# Patient Record
Sex: FEMALE | Employment: OTHER | URBAN - METROPOLITAN AREA
[De-identification: names, ages, dates, MRNs, and addresses within clinical notes are randomized per-mention and may not be internally consistent; named-entity substitution may affect disease eponyms.]

---

## 2021-03-23 ENCOUNTER — NURSING HOME VISIT (OUTPATIENT)
Dept: GERIATRICS | Facility: CLINIC | Age: 86
End: 2021-03-23
Payer: COMMERCIAL

## 2021-03-23 VITALS
WEIGHT: 194.45 LBS | OXYGEN SATURATION: 97 % | BODY MASS INDEX: 30.52 KG/M2 | TEMPERATURE: 99.2 F | HEART RATE: 78 BPM | DIASTOLIC BLOOD PRESSURE: 67 MMHG | SYSTOLIC BLOOD PRESSURE: 116 MMHG | HEIGHT: 67 IN | RESPIRATION RATE: 18 BRPM

## 2021-03-23 DIAGNOSIS — I10 ESSENTIAL HYPERTENSION: ICD-10-CM

## 2021-03-23 DIAGNOSIS — E11.42 DIABETIC POLYNEUROPATHY ASSOCIATED WITH TYPE 2 DIABETES MELLITUS (H): ICD-10-CM

## 2021-03-23 DIAGNOSIS — I82.5Z9 CHRONIC DEEP VEIN THROMBOSIS (DVT) OF DISTAL VEIN OF LOWER EXTREMITY, UNSPECIFIED LATERALITY (H): ICD-10-CM

## 2021-03-23 DIAGNOSIS — Z79.4 TYPE 2 DIABETES MELLITUS WITH HYPERGLYCEMIA, WITH LONG-TERM CURRENT USE OF INSULIN (H): ICD-10-CM

## 2021-03-23 DIAGNOSIS — M84.451S: Primary | ICD-10-CM

## 2021-03-23 DIAGNOSIS — Z79.01 CHRONIC ANTICOAGULATION: ICD-10-CM

## 2021-03-23 DIAGNOSIS — D62 ACUTE BLOOD LOSS ANEMIA: ICD-10-CM

## 2021-03-23 DIAGNOSIS — E11.65 TYPE 2 DIABETES MELLITUS WITH HYPERGLYCEMIA, WITH LONG-TERM CURRENT USE OF INSULIN (H): ICD-10-CM

## 2021-03-23 PROBLEM — R26.81 GAIT INSTABILITY: Status: ACTIVE | Noted: 2019-10-29

## 2021-03-23 PROBLEM — G47.20 SLEEP-WAKE DISORDER: Status: ACTIVE | Noted: 2017-03-23

## 2021-03-23 PROCEDURE — 99309 SBSQ NF CARE MODERATE MDM 30: CPT | Performed by: NURSE PRACTITIONER

## 2021-03-23 RX ORDER — CHLORHEXIDINE GLUCONATE ORAL RINSE 1.2 MG/ML
15 SOLUTION DENTAL 2 TIMES DAILY
COMMUNITY
Start: 2021-01-20

## 2021-03-23 RX ORDER — NITROGLYCERIN 0.4 MG/1
0.4 TABLET SUBLINGUAL EVERY 5 MIN PRN
COMMUNITY

## 2021-03-23 RX ORDER — WARFARIN SODIUM 5 MG/1
TABLET ORAL
Start: 2021-03-23

## 2021-03-23 RX ORDER — AMLODIPINE BESYLATE 5 MG/1
5 TABLET ORAL DAILY
COMMUNITY
Start: 2021-01-21

## 2021-03-23 RX ORDER — INSULIN GLARGINE 100 [IU]/ML
45 INJECTION, SOLUTION SUBCUTANEOUS EVERY MORNING
COMMUNITY
Start: 2021-03-22

## 2021-03-23 RX ORDER — ATORVASTATIN CALCIUM 40 MG/1
40 TABLET, FILM COATED ORAL DAILY
COMMUNITY
Start: 2021-01-13

## 2021-03-23 RX ORDER — CARVEDILOL 25 MG/1
25 TABLET ORAL 2 TIMES DAILY
COMMUNITY
Start: 2021-01-13 | End: 2021-03-23

## 2021-03-23 RX ORDER — GABAPENTIN 100 MG/1
100 CAPSULE ORAL AT BEDTIME
COMMUNITY
Start: 2021-03-02

## 2021-03-23 RX ORDER — OXYCODONE HYDROCHLORIDE 5 MG/1
5-10 TABLET ORAL EVERY 4 HOURS PRN
COMMUNITY
Start: 2021-03-18 | End: 2021-03-29

## 2021-03-23 RX ORDER — CHLORAL HYDRATE 500 MG
2 CAPSULE ORAL DAILY
COMMUNITY

## 2021-03-23 RX ORDER — ALBUTEROL SULFATE 90 UG/1
1 AEROSOL, METERED RESPIRATORY (INHALATION) EVERY 4 HOURS PRN
COMMUNITY
Start: 2020-06-25

## 2021-03-23 RX ORDER — CARVEDILOL 12.5 MG/1
12.5 TABLET ORAL 2 TIMES DAILY
Start: 2021-03-23 | End: 2021-03-30

## 2021-03-23 RX ORDER — LANOLIN ALCOHOL/MO/W.PET/CERES
3 CREAM (GRAM) TOPICAL AT BEDTIME
COMMUNITY
Start: 2021-03-22

## 2021-03-23 RX ORDER — AMOXICILLIN 250 MG
2 CAPSULE ORAL 2 TIMES DAILY PRN
COMMUNITY
Start: 2021-03-20

## 2021-03-23 RX ORDER — POLYETHYLENE GLYCOL 3350 17 G/17G
17 POWDER, FOR SOLUTION ORAL DAILY
COMMUNITY
Start: 2021-03-20

## 2021-03-23 RX ORDER — ACETAMINOPHEN 325 MG/1
650 TABLET ORAL EVERY 4 HOURS PRN
COMMUNITY
Start: 2021-03-18 | End: 2021-03-27 | Stop reason: ALTCHOICE

## 2021-03-23 RX ORDER — PANTOPRAZOLE SODIUM 40 MG/1
1 TABLET, DELAYED RELEASE ORAL DAILY
COMMUNITY
Start: 2020-09-14

## 2021-03-23 ASSESSMENT — MIFFLIN-ST. JEOR: SCORE: 1354.63

## 2021-03-23 NOTE — PROGRESS NOTES
"[place] [facility]  PRIMARY CARE PROVIDER AND CLINIC: Garret Abel MD  Chief Complaint   Patient presents with     [facility] F/U     [place] Medical Record Number:  [id number]  Place of Service where encounter took place:  No question data found.    Nuria Whitley  is a 86 year old  (5/24/1934), admitted to the above facility from  [facility]. Hospital stay 3/15/21 through 3/22/21..  Admitted to this facility for  rehab, medical management and nursing care.    HPI:    HPI information obtained from: facility chart records, facility staff, patient report and Care Everywhere Epic chart review.   Brief Summary of Hospital Course: Patient had seen her PCP for right hip/leg pain, but declined xray. Later that day she was walking up steps with her son and they heard a pop and she was unable to bear weight on the right leg. At the ED she was found to have a pathologic fracture of her right femur with a lytic lesion. She underwent ORIF. Post-op course was complicated by anemia, Hgb 7.1, she received 2 units PRBCs. Lantus insulin decreased from 55 units BID to 45 units BID. She remains on her home regimen of Novolog. She is on chronic warfarin for \"chronic\" DVT, but it is not clear how long she has been on this. During brief attempt at chart review, it has been several years.     Updates on Status Since Skilled nursing Admission:   Patient reports that her hip pain is generally controlled. She does have a difficult time getting comfortable in the wheelchair, prefers to have her leg elevated. She says her appetite is good, bowels and bladder are moving fine. She just arrived yesterday, so is awaiting her PT/OT eval. Only 3 BG readings since admit: 177, 277, 162    CODE STATUS/ADVANCE DIRECTIVES DISCUSSION:   CPR/Full code   Patient's living condition: lives with family, son   ALLERGIES: Hydrocodone-acetaminophen  PAST MEDICAL HISTORY:  has no past medical history on file.  PAST " SURGICAL HISTORY:   has no past surgical history on file.  FAMILY HISTORY: family history is not on file.  SOCIAL HISTORY:       Post Discharge Medication Reconciliation Status: discharge medications reconciled, continue medications without change    Current Outpatient Medications   Medication Sig Dispense Refill     acetaminophen (TYLENOL) 325 MG tablet Take 650 mg by mouth every 4 hours as needed       albuterol (PROAIR HFA/PROVENTIL HFA/VENTOLIN HFA) 108 (90 Base) MCG/ACT inhaler Inhale 1 puff into the lungs every 4 hours as needed       amLODIPine (NORVASC) 5 MG tablet Take 5 mg by mouth daily       atorvastatin (LIPITOR) 40 MG tablet Take 40 mg by mouth daily       calcium carbonate-vitamin D (OS-KALYAN) 600-400 MG-UNIT chewable tablet Take 1 chew tab by mouth daily       carvedilol (COREG) 12.5 MG tablet Take 1 tablet (12.5 mg) by mouth 2 times daily       chlorhexidine (PERIDEX) 0.12 % solution Swish and spit 15 mLs in mouth 2 times daily       fish oil-omega-3 fatty acids 1000 MG capsule Take 2 g by mouth daily       gabapentin (NEURONTIN) 100 MG capsule Take 100 mg by mouth At Bedtime       insulin aspart (NOVOLOG PEN) 100 UNIT/ML pen Inject Subcutaneous 3 times daily (with meals) If Blood Sugar is 120 to 149, give 2 Units.  If Blood Sugar is 150 to 199, give 3 Units.  If Blood Sugar is 200 to 249, give 6 Units.  If Blood Sugar is 250 to 299, give 9 Units.  If Blood Sugar is 300 to 349, give 12 Units.  If Blood Sugar is greater than 349, give 15 Units.  If Blood Sugar is greater than 349, call MD.  Special Instructions: Recheck POCT glucose in 2 hours and call MD if greater than 350 mg/dl    ALSO   Per Sliding Scale;  If Blood Sugar is 150 to 199, give 2 Units.  If Blood Sugar is 200 to 249, give 3 Units.  If Blood Sugar is 250 to 299, give 5 Units.  If Blood Sugar is 300 to 349, give 6 Units.  If Blood Sugar is greater than 349, give 8 Units.  At Bedtime       insulin aspart (NOVOLOG VIAL) 100 UNITS/ML vial  "Inject 15-17 Units Subcutaneous 2 times daily Give 15 units before lunch and give 17 units before supper daily       insulin glargine (LANTUS VIAL) 100 UNIT/ML vial Inject 45 Units Subcutaneous 2 times daily       melatonin 3 MG tablet Take 3 mg by mouth At Bedtime       nitroGLYcerin (NITROSTAT) 0.4 MG sublingual tablet Place 0.4 mg under the tongue every 5 minutes as needed for chest pain For chest pain place 1 tablet under the tongue every 5 minutes for 3 doses. If symptoms persist 5 minutes after 1st dose call 911.       oxyCODONE (ROXICODONE) 5 MG tablet Take 5-10 mg by mouth every 4 hours as needed Instructions: 5 mg for pain 5-7/10, 10 mg for pain 8-10/10       pantoprazole (PROTONIX) 40 MG EC tablet Take 1 tablet by mouth daily       polyethylene glycol (MIRALAX) 17 GM/Dose powder Take 17 g by mouth daily       senna-docusate (SENOKOT-S/PERICOLACE) 8.6-50 MG tablet Take 2 tablets by mouth 2 times daily as needed       warfarin ANTICOAGULANT (COUMADIN) 5 MG tablet Take 5mg on Sun,Mon,Wed,Fri and take 7.5mg on Tues,Thurs,Sat         ROS:  10 point ROS of systems including Constitutional, Eyes, Respiratory, Cardiovascular, Gastroenterology, Genitourinary, Integumentary, Musculoskeletal, Psychiatric were all negative except for pertinent positives noted in my HPI.    Vitals:  /67   Pulse 78   Temp 99.2  F (37.3  C)   Resp 18   Ht 1.702 m (5' 7\")   Wt 88.2 kg (194 lb 7.1 oz)   SpO2 97%   BMI 30.45 kg/m    Exam:  GENERAL APPEARANCE:  Alert, in no distress, oriented  ENT:  Mouth and posterior oropharynx normal, moist mucous membranes, normal hearing acuity  EYES:  EOM, conjunctivae, lids, pupils and irises normal  RESP:  respiratory effort and palpation of chest normal, lungs clear to auscultation , no respiratory distress  CV:  Palpation and auscultation of heart done , regular rate and rhythm, no murmur, rub, or gallop, no edema  ABDOMEN:  normal bowel sounds, soft, nontender, no hepatosplenomegaly " or other masses  SKIN:  wound healing well, no signs of infection right hip  PSYCH:  oriented X 3, affect and mood normal    Lab/Diagnostic data:  Recent labs in [facility] reviewed by me today.    INR 1.8 today    ASSESSMENT/PLAN:  (M84.451S) Pathological fracture of right femur, unspecified pathological cause, sequela  (primary encounter diagnosis)  Comment: s/p ORIF. Pain controlled. No s/sx DVT. No s/sx poor wound healing. Goal is to discharge home when PT/OT goals met.  Plan: PT/OT eval and treat, discharge planning per their recommendation. Continue warfarin, monitor s/sx DVT.  Continue oxycodone prn for pain, monitor pain severity. Monitor incision. F/u ortho as scheduled. F/u PCP regarding biopsy results, likely an isolated lesion.    (D62) Acute blood loss anemia  Comment: Due to surgical loss. No s/sx bleeding or anemia today  Plan: Hgb prn    (I10) Essential hypertension  Comment: Little data to assess, BP controlled today. To avoid risk of hypotension, falls, dizziness and tissue hypoperfusion, recommend  BP goal is < 150/90mmHg.  Plan:  Monitor BP. Adjust medication as clinically indicated.    (Z79.01) Chronic anticoagulation  (I82.5Z9) Chronic deep vein thrombosis (DVT) of distal vein of lower extremity, unspecified laterality (H)  Comment: INR is almost therapeutic. Will give dose that appears to be her usual home dose per chart review  Plan: warfarin 5mg Sun,M,W,F and 7.5mg AOD    (E11.65,  Z79.4) Type 2 diabetes mellitus with hyperglycemia, with long-term current use of insulin (H)  Comment: Too little data available to recommend any changes today  Plan: BGM QID. Adjust medication as clinically indicated.    (E11.42) Diabetic polyneuropathy associated with type 2 diabetes mellitus (H)  Comment: Pain controlled  Plan: Continue current POC with no changes at this time and adjustments as needed.      Electronically signed by:  BRENDA Vogel [facility]  Phone:  [phone]

## 2021-03-23 NOTE — LETTER
"    3/23/2021        RE: Nuria Whitley  [address]  [facility] [address]        [place] [facility]  PRIMARY CARE PROVIDER AND CLINIC: Garret Abel MD  Chief Complaint   Patient presents with     [facility] F/U     [place] Medical Record Number:  [id number]  Place of Service where encounter took place:  No question data found.    Nuria Whitley  is a 86 year old  (5/24/1934), admitted to the above facility from  [facility]. Hospital stay 3/15/21 through 3/22/21..  Admitted to this facility for  rehab, medical management and nursing care.    HPI:    HPI information obtained from: facility chart records, facility staff, patient report and Care Everywhere Epic chart review.   Brief Summary of Hospital Course: Patient had seen her PCP for right hip/leg pain, but declined xray. Later that day she was walking up steps with her son and they heard a pop and she was unable to bear weight on the right leg. At the ED she was found to have a pathologic fracture of her right femur with a lytic lesion. She underwent ORIF. Post-op course was complicated by anemia, Hgb 7.1, she received 2 units PRBCs. Lantus insulin decreased from 55 units BID to 45 units BID. She remains on her home regimen of Novolog. She is on chronic warfarin for \"chronic\" DVT, but it is not clear how long she has been on this. During brief attempt at chart review, it has been several years.     Updates on Status Since Skilled nursing Admission:   Patient reports that her hip pain is generally controlled. She does have a difficult time getting comfortable in the wheelchair, prefers to have her leg elevated. She says her appetite is good, bowels and bladder are moving fine. She just arrived yesterday, so is awaiting her PT/OT eval. Only 3 BG readings since admit: 177, 277, 162    CODE STATUS/ADVANCE DIRECTIVES DISCUSSION:   CPR/Full code   Patient's living condition: lives with family, son   ALLERGIES: " Hydrocodone-acetaminophen  PAST MEDICAL HISTORY:  has no past medical history on file.  PAST SURGICAL HISTORY:   has no past surgical history on file.  FAMILY HISTORY: family history is not on file.  SOCIAL HISTORY:       Post Discharge Medication Reconciliation Status: discharge medications reconciled, continue medications without change    Current Outpatient Medications   Medication Sig Dispense Refill     acetaminophen (TYLENOL) 325 MG tablet Take 650 mg by mouth every 4 hours as needed       albuterol (PROAIR HFA/PROVENTIL HFA/VENTOLIN HFA) 108 (90 Base) MCG/ACT inhaler Inhale 1 puff into the lungs every 4 hours as needed       amLODIPine (NORVASC) 5 MG tablet Take 5 mg by mouth daily       atorvastatin (LIPITOR) 40 MG tablet Take 40 mg by mouth daily       calcium carbonate-vitamin D (OS-KALYAN) 600-400 MG-UNIT chewable tablet Take 1 chew tab by mouth daily       carvedilol (COREG) 12.5 MG tablet Take 1 tablet (12.5 mg) by mouth 2 times daily       chlorhexidine (PERIDEX) 0.12 % solution Swish and spit 15 mLs in mouth 2 times daily       fish oil-omega-3 fatty acids 1000 MG capsule Take 2 g by mouth daily       gabapentin (NEURONTIN) 100 MG capsule Take 100 mg by mouth At Bedtime       insulin aspart (NOVOLOG PEN) 100 UNIT/ML pen Inject Subcutaneous 3 times daily (with meals) If Blood Sugar is 120 to 149, give 2 Units.  If Blood Sugar is 150 to 199, give 3 Units.  If Blood Sugar is 200 to 249, give 6 Units.  If Blood Sugar is 250 to 299, give 9 Units.  If Blood Sugar is 300 to 349, give 12 Units.  If Blood Sugar is greater than 349, give 15 Units.  If Blood Sugar is greater than 349, call MD.  Special Instructions: Recheck POCT glucose in 2 hours and call MD if greater than 350 mg/dl    ALSO   Per Sliding Scale;  If Blood Sugar is 150 to 199, give 2 Units.  If Blood Sugar is 200 to 249, give 3 Units.  If Blood Sugar is 250 to 299, give 5 Units.  If Blood Sugar is 300 to 349, give 6 Units.  If Blood Sugar is  "greater than 349, give 8 Units.  At Bedtime       insulin aspart (NOVOLOG VIAL) 100 UNITS/ML vial Inject 15-17 Units Subcutaneous 2 times daily Give 15 units before lunch and give 17 units before supper daily       insulin glargine (LANTUS VIAL) 100 UNIT/ML vial Inject 45 Units Subcutaneous 2 times daily       melatonin 3 MG tablet Take 3 mg by mouth At Bedtime       nitroGLYcerin (NITROSTAT) 0.4 MG sublingual tablet Place 0.4 mg under the tongue every 5 minutes as needed for chest pain For chest pain place 1 tablet under the tongue every 5 minutes for 3 doses. If symptoms persist 5 minutes after 1st dose call 911.       oxyCODONE (ROXICODONE) 5 MG tablet Take 5-10 mg by mouth every 4 hours as needed Instructions: 5 mg for pain 5-7/10, 10 mg for pain 8-10/10       pantoprazole (PROTONIX) 40 MG EC tablet Take 1 tablet by mouth daily       polyethylene glycol (MIRALAX) 17 GM/Dose powder Take 17 g by mouth daily       senna-docusate (SENOKOT-S/PERICOLACE) 8.6-50 MG tablet Take 2 tablets by mouth 2 times daily as needed       warfarin ANTICOAGULANT (COUMADIN) 5 MG tablet Take 5mg on Sun,Mon,Wed,Fri and take 7.5mg on Tues,Thurs,Sat         ROS:  10 point ROS of systems including Constitutional, Eyes, Respiratory, Cardiovascular, Gastroenterology, Genitourinary, Integumentary, Musculoskeletal, Psychiatric were all negative except for pertinent positives noted in my HPI.    Vitals:  /67   Pulse 78   Temp 99.2  F (37.3  C)   Resp 18   Ht 1.702 m (5' 7\")   Wt 88.2 kg (194 lb 7.1 oz)   SpO2 97%   BMI 30.45 kg/m    Exam:  GENERAL APPEARANCE:  Alert, in no distress, oriented  ENT:  Mouth and posterior oropharynx normal, moist mucous membranes, normal hearing acuity  EYES:  EOM, conjunctivae, lids, pupils and irises normal  RESP:  respiratory effort and palpation of chest normal, lungs clear to auscultation , no respiratory distress  CV:  Palpation and auscultation of heart done , regular rate and rhythm, no murmur, " rub, or gallop, no edema  ABDOMEN:  normal bowel sounds, soft, nontender, no hepatosplenomegaly or other masses  SKIN:  wound healing well, no signs of infection right hip  PSYCH:  oriented X 3, affect and mood normal    Lab/Diagnostic data:  Recent labs in [facility] reviewed by me today.    INR 1.8 today    ASSESSMENT/PLAN:  (M84.451S) Pathological fracture of right femur, unspecified pathological cause, sequela  (primary encounter diagnosis)  Comment: s/p ORIF. Pain controlled. No s/sx DVT. No s/sx poor wound healing. Goal is to discharge home when PT/OT goals met.  Plan: PT/OT eval and treat, discharge planning per their recommendation. Continue warfarin, monitor s/sx DVT.  Continue oxycodone prn for pain, monitor pain severity. Monitor incision. F/u ortho as scheduled. F/u PCP regarding biopsy results, likely an isolated lesion.    (D62) Acute blood loss anemia  Comment: Due to surgical loss. No s/sx bleeding or anemia today  Plan: Hgb prn    (I10) Essential hypertension  Comment: Little data to assess, BP controlled today. To avoid risk of hypotension, falls, dizziness and tissue hypoperfusion, recommend  BP goal is < 150/90mmHg.  Plan:  Monitor BP. Adjust medication as clinically indicated.    (Z79.01) Chronic anticoagulation  (I82.5Z9) Chronic deep vein thrombosis (DVT) of distal vein of lower extremity, unspecified laterality (H)  Comment: INR is almost therapeutic. Will give dose that appears to be her usual home dose per chart review  Plan: warfarin 5mg Sun,M,W,F and 7.5mg AOD    (E11.65,  Z79.4) Type 2 diabetes mellitus with hyperglycemia, with long-term current use of insulin (H)  Comment: Too little data available to recommend any changes today  Plan: BGM QID. Adjust medication as clinically indicated.    (E11.42) Diabetic polyneuropathy associated with type 2 diabetes mellitus (H)  Comment: Pain controlled  Plan: Continue current POC with no changes at this time and adjustments as  needed.      Electronically signed by:  BRENDA Vogel [facility]  Phone: [phone]

## 2021-03-25 ENCOUNTER — NURSING HOME VISIT (OUTPATIENT)
Dept: GERIATRICS | Facility: CLINIC | Age: 86
End: 2021-03-25
Payer: COMMERCIAL

## 2021-03-25 VITALS
DIASTOLIC BLOOD PRESSURE: 58 MMHG | HEIGHT: 67 IN | RESPIRATION RATE: 18 BRPM | WEIGHT: 194.45 LBS | OXYGEN SATURATION: 95 % | HEART RATE: 68 BPM | TEMPERATURE: 98.5 F | BODY MASS INDEX: 30.52 KG/M2 | SYSTOLIC BLOOD PRESSURE: 94 MMHG

## 2021-03-25 DIAGNOSIS — M84.451S: Primary | ICD-10-CM

## 2021-03-25 DIAGNOSIS — Z79.01 CHRONIC ANTICOAGULATION: ICD-10-CM

## 2021-03-25 DIAGNOSIS — E11.65 TYPE 2 DIABETES MELLITUS WITH HYPERGLYCEMIA, WITH LONG-TERM CURRENT USE OF INSULIN (H): ICD-10-CM

## 2021-03-25 DIAGNOSIS — I25.118 ATHEROSCLEROSIS OF CORONARY ARTERY OF NATIVE HEART WITH STABLE ANGINA PECTORIS, UNSPECIFIED VESSEL OR LESION TYPE (H): ICD-10-CM

## 2021-03-25 DIAGNOSIS — I82.5Z9 CHRONIC DEEP VEIN THROMBOSIS (DVT) OF DISTAL VEIN OF LOWER EXTREMITY, UNSPECIFIED LATERALITY (H): ICD-10-CM

## 2021-03-25 DIAGNOSIS — I10 ESSENTIAL HYPERTENSION: ICD-10-CM

## 2021-03-25 DIAGNOSIS — Z79.4 TYPE 2 DIABETES MELLITUS WITH HYPERGLYCEMIA, WITH LONG-TERM CURRENT USE OF INSULIN (H): ICD-10-CM

## 2021-03-25 PROCEDURE — 99309 SBSQ NF CARE MODERATE MDM 30: CPT | Performed by: NURSE PRACTITIONER

## 2021-03-25 ASSESSMENT — MIFFLIN-ST. JEOR: SCORE: 1354.65

## 2021-03-25 NOTE — PROGRESS NOTES
"[place] [facility]  [place] Medical Record Number:  [id number]  Place of Service where encounter took place:  [facility] (U) [[id number]]  Chief Complaint   Patient presents with     RECHECK       HPI:    Nuria Whitley  is a 86 year old (5/24/1934), who is being seen today for an episodic care visit. [facility] stay 3/15/21 through 3/22/21. Admitted to this facility for  rehab, medical management and nursing care. Patient had seen her PCP for right hip/leg pain, but declined xray. Later that day she was walking up steps with her son and they heard a pop and she was unable to bear weight on the right leg. At the ED she was found to have a pathologic fracture of her right femur with a lytic lesion. She underwent ORIF. Post-op course was complicated by anemia, Hgb 7.1, she received 2 units PRBCs. Lantus insulin decreased from 55 units BID to 45 units BID. She remains on her home regimen of Novolog. She is on chronic warfarin for \"chronic\" DVT, but it is not clear how long she has been on this. During brief attempt at chart review, it has been several years.     HPI information obtained from: facility chart records, facility staff and patient report.     Today's concern is:  Pathological fracture of right femur, unspecified pathological cause, sequela  Patient says that her pain is controlled and therapy is going ok. No swelling, no calf pain    Essential hypertension  BP 90-100s/50s. 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 LAD: Occluded mid vessel. LCx: 50-75% stenosis mid vessel. RCA: " "50-75% stenosis mid vessel. Successful PTCA/stenting of occluded LAD with 3.0 x 18 mm and 3.5 x 15 mm\"        Past Medical and Surgical History reviewed in Epic today.    MEDICATIONS:    Current Outpatient Medications   Medication Sig Dispense Refill     acetaminophen (TYLENOL) 500 MG tablet Take 2 tablets (1,000 mg) by mouth 3 times daily       albuterol (PROAIR HFA/PROVENTIL HFA/VENTOLIN HFA) 108 (90 Base) MCG/ACT inhaler Inhale 1 puff into the lungs every 4 hours as needed       amLODIPine (NORVASC) 5 MG tablet Take 5 mg by mouth daily       atorvastatin (LIPITOR) 40 MG tablet Take 40 mg by mouth daily       calcium carbonate-vitamin D (OS-KALYAN) 600-400 MG-UNIT chewable tablet Take 1 chew tab by mouth daily       carvedilol (COREG) 12.5 MG tablet Take 1 tablet (12.5 mg) by mouth 2 times daily       chlorhexidine (PERIDEX) 0.12 % solution Swish and spit 15 mLs in mouth 2 times daily       fish oil-omega-3 fatty acids 1000 MG capsule Take 2 g by mouth daily       gabapentin (NEURONTIN) 100 MG capsule Take 100 mg by mouth At Bedtime       insulin aspart (NOVOLOG PEN) 100 UNIT/ML pen Inject Subcutaneous 3 times daily (with meals) If Blood Sugar is 120 to 149, give 2 Units.  If Blood Sugar is 150 to 199, give 3 Units.  If Blood Sugar is 200 to 249, give 6 Units.  If Blood Sugar is 250 to 299, give 9 Units.  If Blood Sugar is 300 to 349, give 12 Units.  If Blood Sugar is greater than 349, give 15 Units.  If Blood Sugar is greater than 349, call MD.  Special Instructions: Recheck POCT glucose in 2 hours and call MD if greater than 350 mg/dl    ALSO   Per Sliding Scale;  If Blood Sugar is 150 to 199, give 2 Units.  If Blood Sugar is 200 to 249, give 3 Units.  If Blood Sugar is 250 to 299, give 5 Units.  If Blood Sugar is 300 to 349, give 6 Units.  If Blood Sugar is greater than 349, give 8 Units.  At Bedtime       insulin aspart (NOVOLOG VIAL) 100 UNITS/ML vial Inject 15-17 Units Subcutaneous 2 times daily Give 15 units " "before lunch and give 17 units before supper daily       insulin glargine (LANTUS VIAL) 100 UNIT/ML vial Inject 45 Units Subcutaneous 2 times daily       melatonin 3 MG tablet Take 3 mg by mouth At Bedtime       nitroGLYcerin (NITROSTAT) 0.4 MG sublingual tablet Place 0.4 mg under the tongue every 5 minutes as needed for chest pain For chest pain place 1 tablet under the tongue every 5 minutes for 3 doses. If symptoms persist 5 minutes after 1st dose call 911.       oxyCODONE (ROXICODONE) 5 MG tablet Take 5-10 mg by mouth every 4 hours as needed Instructions: 5 mg for pain 5-7/10, 10 mg for pain 8-10/10       pantoprazole (PROTONIX) 40 MG EC tablet Take 1 tablet by mouth daily       polyethylene glycol (MIRALAX) 17 GM/Dose powder Take 17 g by mouth daily       senna-docusate (SENOKOT-S/PERICOLACE) 8.6-50 MG tablet Take 2 tablets by mouth 2 times daily as needed       warfarin ANTICOAGULANT (COUMADIN) 5 MG tablet Take 5mg on Sun,Mon,Wed,Fri and take 7.5mg on Tues,Thurs,Sat           REVIEW OF SYSTEMS:  10 point ROS of systems including Constitutional, Eyes, Respiratory, Cardiovascular, Gastroenterology, Genitourinary, Integumentary, Musculoskeletal, Psychiatric were all negative except for pertinent positives noted in my HPI.    Objective:  BP 94/58   Pulse 68   Temp 98.5  F (36.9  C)   Resp 18   Ht 1.702 m (5' 7\")   Wt 88.2 kg (194 lb 7.2 oz)   SpO2 95%   BMI 30.46 kg/m    Exam:  GENERAL APPEARANCE:  Alert, in no distress, oriented  ENT:  Mouth and posterior oropharynx normal, moist mucous membranes, normal hearing acuity  EYES:  EOM, conjunctivae, lids, pupils and irises normal  RESP:  respiratory effort and palpation of chest normal, lungs clear to auscultation , no respiratory distress  CV:  Palpation and auscultation of heart done , regular rate and rhythm, no murmur, rub, or gallop, no edema  ABDOMEN:  normal bowel sounds, soft, nontender, no hepatosplenomegaly or other masses  SKIN:  wound healing well, " no signs of infection right hip  PSYCH:  oriented X 3, affect and mood normal      Labs:   Recent labs in [facility] reviewed by me today.       ASSESSMENT/PLAN:  (M84.451S) Pathological fracture of right femur, unspecified pathological cause, sequela  (primary encounter diagnosis)  Comment: s/p ORIF. Pain controlled. No s/sx DVT. No s/sx poor wound healing. Goal is to discharge home when PT/OT goals met.  Plan: PT/OT eval and treat, discharge planning per their recommendation. Continue warfarin, monitor s/sx DVT. Continue oxycodone prn for pain, monitor pain severity. Monitor incision. F/u ortho as scheduled.    (I10) Essential hypertension  Comment: BP too tightly controlled, which increases her fall risk. Will decrease her BB  Plan: Decrease carvedilol to 6.25mg BID. Monitor BP. Adjust medication as clinically indicated.    (Z79.01) Chronic anticoagulation  (I82.5Z9) Chronic deep vein thrombosis (DVT) of distal vein of lower extremity, unspecified laterality (H)  Comment: Chronic warfarin, INR goal 2-3  Plan: Continue current POC with no changes at this time and adjustments as needed.    (E11.65,  Z79.4) Type 2 diabetes mellitus with hyperglycemia, with long-term current use of insulin (H)  Comment: BG are too variable to adjust insulin, need to continue with sliding scale insulin.   Plan: Continue current POC with no changes at this time and adjustments as needed.    (I25.118) Atherosclerosis of coronary artery of native heart with stable angina pectoris, unspecified vessel or lesion type (H)  Comment: Asymptomatic  Plan: Continue current POC with no changes at this time and adjustments as needed.      Electronically signed by:  BRENDA Vogel [facility]  Phone: [phone]

## 2021-03-25 NOTE — LETTER
"    3/25/2021        RE: Nuria Whitley  [address]  [facility] [address]        [place] [facility]  [place] Medical Record Number:  [id number]  Place of Service where encounter took place:  [facility] ([facility]) [[id number]]  Chief Complaint   Patient presents with     RECHECK       HPI:    Nuria Whitley  is a 86 year old (5/24/1934), who is being seen today for an episodic care visit. [facility] stay 3/15/21 through 3/22/21. Admitted to this facility for  rehab, medical management and nursing care. Patient had seen her PCP for right hip/leg pain, but declined xray. Later that day she was walking up steps with her son and they heard a pop and she was unable to bear weight on the right leg. At the ED she was found to have a pathologic fracture of her right femur with a lytic lesion. She underwent ORIF. Post-op course was complicated by anemia, Hgb 7.1, she received 2 units PRBCs. Lantus insulin decreased from 55 units BID to 45 units BID. She remains on her home regimen of Novolog. She is on chronic warfarin for \"chronic\" DVT, but it is not clear how long she has been on this. During brief attempt at chart review, it has been several years.     HPI information obtained from: facility chart records, facility staff and patient report.     Today's concern is:  Pathological fracture of right femur, unspecified pathological cause, sequela  Patient says that her pain is controlled and therapy is going ok. No swelling, no calf pain    Essential hypertension  BP 90-100s/50s. HR 70s    Chronic anticoagulation  Chronic deep vein thrombosis (DVT) of distal vein of lower extremity, unspecified laterality (H)  On warfarin    Type 2 diabetes mellitus with hyperglycemia, with long-term current use of insulin (H)  BG     Atherosclerosis of coronary artery of native heart with stable angina pectoris, unspecified vessel or lesion type (H)  Per cardiology note from a year ago " "\"Angio-05/18/09: LM: 25% distal stenosis. LAD: Occluded mid vessel. LCx: 50-75% stenosis mid vessel. RCA: 50-75% stenosis mid vessel. Successful PTCA/stenting of occluded LAD with 3.0 x 18 mm and 3.5 x 15 mm\"        Past Medical and Surgical History reviewed in Epic today.    MEDICATIONS:    Current Outpatient Medications   Medication Sig Dispense Refill     acetaminophen (TYLENOL) 500 MG tablet Take 2 tablets (1,000 mg) by mouth 3 times daily       albuterol (PROAIR HFA/PROVENTIL HFA/VENTOLIN HFA) 108 (90 Base) MCG/ACT inhaler Inhale 1 puff into the lungs every 4 hours as needed       amLODIPine (NORVASC) 5 MG tablet Take 5 mg by mouth daily       atorvastatin (LIPITOR) 40 MG tablet Take 40 mg by mouth daily       calcium carbonate-vitamin D (OS-KALYAN) 600-400 MG-UNIT chewable tablet Take 1 chew tab by mouth daily       carvedilol (COREG) 12.5 MG tablet Take 1 tablet (12.5 mg) by mouth 2 times daily       chlorhexidine (PERIDEX) 0.12 % solution Swish and spit 15 mLs in mouth 2 times daily       fish oil-omega-3 fatty acids 1000 MG capsule Take 2 g by mouth daily       gabapentin (NEURONTIN) 100 MG capsule Take 100 mg by mouth At Bedtime       insulin aspart (NOVOLOG PEN) 100 UNIT/ML pen Inject Subcutaneous 3 times daily (with meals) If Blood Sugar is 120 to 149, give 2 Units.  If Blood Sugar is 150 to 199, give 3 Units.  If Blood Sugar is 200 to 249, give 6 Units.  If Blood Sugar is 250 to 299, give 9 Units.  If Blood Sugar is 300 to 349, give 12 Units.  If Blood Sugar is greater than 349, give 15 Units.  If Blood Sugar is greater than 349, call MD.  Special Instructions: Recheck POCT glucose in 2 hours and call MD if greater than 350 mg/dl    ALSO   Per Sliding Scale;  If Blood Sugar is 150 to 199, give 2 Units.  If Blood Sugar is 200 to 249, give 3 Units.  If Blood Sugar is 250 to 299, give 5 Units.  If Blood Sugar is 300 to 349, give 6 Units.  If Blood Sugar is greater than 349, give 8 Units.  At Bedtime       " "insulin aspart (NOVOLOG VIAL) 100 UNITS/ML vial Inject 15-17 Units Subcutaneous 2 times daily Give 15 units before lunch and give 17 units before supper daily       insulin glargine (LANTUS VIAL) 100 UNIT/ML vial Inject 45 Units Subcutaneous 2 times daily       melatonin 3 MG tablet Take 3 mg by mouth At Bedtime       nitroGLYcerin (NITROSTAT) 0.4 MG sublingual tablet Place 0.4 mg under the tongue every 5 minutes as needed for chest pain For chest pain place 1 tablet under the tongue every 5 minutes for 3 doses. If symptoms persist 5 minutes after 1st dose call 911.       oxyCODONE (ROXICODONE) 5 MG tablet Take 5-10 mg by mouth every 4 hours as needed Instructions: 5 mg for pain 5-7/10, 10 mg for pain 8-10/10       pantoprazole (PROTONIX) 40 MG EC tablet Take 1 tablet by mouth daily       polyethylene glycol (MIRALAX) 17 GM/Dose powder Take 17 g by mouth daily       senna-docusate (SENOKOT-S/PERICOLACE) 8.6-50 MG tablet Take 2 tablets by mouth 2 times daily as needed       warfarin ANTICOAGULANT (COUMADIN) 5 MG tablet Take 5mg on Sun,Mon,Wed,Fri and take 7.5mg on Tues,Thurs,Sat           REVIEW OF SYSTEMS:  10 point ROS of systems including Constitutional, Eyes, Respiratory, Cardiovascular, Gastroenterology, Genitourinary, Integumentary, Musculoskeletal, Psychiatric were all negative except for pertinent positives noted in my HPI.    Objective:  BP 94/58   Pulse 68   Temp 98.5  F (36.9  C)   Resp 18   Ht 1.702 m (5' 7\")   Wt 88.2 kg (194 lb 7.2 oz)   SpO2 95%   BMI 30.46 kg/m    Exam:  GENERAL APPEARANCE:  Alert, in no distress, oriented  ENT:  Mouth and posterior oropharynx normal, moist mucous membranes, normal hearing acuity  EYES:  EOM, conjunctivae, lids, pupils and irises normal  RESP:  respiratory effort and palpation of chest normal, lungs clear to auscultation , no respiratory distress  CV:  Palpation and auscultation of heart done , regular rate and rhythm, no murmur, rub, or gallop, no " edema  ABDOMEN:  normal bowel sounds, soft, nontender, no hepatosplenomegaly or other masses  SKIN:  wound healing well, no signs of infection right hip  PSYCH:  oriented X 3, affect and mood normal      Labs:   Recent labs in [facility] reviewed by me today.       ASSESSMENT/PLAN:  (M84.451S) Pathological fracture of right femur, unspecified pathological cause, sequela  (primary encounter diagnosis)  Comment: s/p ORIF. Pain controlled. No s/sx DVT. No s/sx poor wound healing. Goal is to discharge home when PT/OT goals met.  Plan: PT/OT eval and treat, discharge planning per their recommendation. Continue warfarin, monitor s/sx DVT. Continue oxycodone prn for pain, monitor pain severity. Monitor incision. F/u ortho as scheduled.    (I10) Essential hypertension  Comment: BP too tightly controlled, which increases her fall risk. Will decrease her BB  Plan: Decrease carvedilol to 6.25mg BID. Monitor BP. Adjust medication as clinically indicated.    (Z79.01) Chronic anticoagulation  (I82.5Z9) Chronic deep vein thrombosis (DVT) of distal vein of lower extremity, unspecified laterality (H)  Comment: Chronic warfarin, INR goal 2-3  Plan: Continue current POC with no changes at this time and adjustments as needed.    (E11.65,  Z79.4) Type 2 diabetes mellitus with hyperglycemia, with long-term current use of insulin (H)  Comment: BG are too variable to adjust insulin, need to continue with sliding scale insulin.   Plan: Continue current POC with no changes at this time and adjustments as needed.    (I25.118) Atherosclerosis of coronary artery of native heart with stable angina pectoris, unspecified vessel or lesion type (H)  Comment: Asymptomatic  Plan: Continue current POC with no changes at this time and adjustments as needed.      Electronically signed by:  BRENDA Vogel [facility]  Phone: [phone]

## 2021-03-27 RX ORDER — ACETAMINOPHEN 500 MG
1000 TABLET ORAL 3 TIMES DAILY
Start: 2021-03-23

## 2021-03-29 DIAGNOSIS — M84.451S: Primary | ICD-10-CM

## 2021-03-29 RX ORDER — OXYCODONE HYDROCHLORIDE 5 MG/1
5-10 TABLET ORAL EVERY 4 HOURS PRN
Qty: 100 TABLET | Refills: 0 | Status: SHIPPED | OUTPATIENT
Start: 2021-03-29

## 2021-03-30 ENCOUNTER — NURSING HOME VISIT (OUTPATIENT)
Dept: GERIATRICS | Facility: CLINIC | Age: 86
End: 2021-03-30
Payer: COMMERCIAL

## 2021-03-30 VITALS
BODY MASS INDEX: 30.52 KG/M2 | SYSTOLIC BLOOD PRESSURE: 104 MMHG | OXYGEN SATURATION: 98 % | WEIGHT: 194.45 LBS | RESPIRATION RATE: 18 BRPM | HEART RATE: 69 BPM | DIASTOLIC BLOOD PRESSURE: 55 MMHG | TEMPERATURE: 98.7 F | HEIGHT: 67 IN

## 2021-03-30 DIAGNOSIS — I10 ESSENTIAL HYPERTENSION: ICD-10-CM

## 2021-03-30 DIAGNOSIS — I82.5Z9 CHRONIC DEEP VEIN THROMBOSIS (DVT) OF DISTAL VEIN OF LOWER EXTREMITY, UNSPECIFIED LATERALITY (H): ICD-10-CM

## 2021-03-30 DIAGNOSIS — E11.65 TYPE 2 DIABETES MELLITUS WITH HYPERGLYCEMIA, WITH LONG-TERM CURRENT USE OF INSULIN (H): ICD-10-CM

## 2021-03-30 DIAGNOSIS — I25.118 ATHEROSCLEROSIS OF CORONARY ARTERY OF NATIVE HEART WITH STABLE ANGINA PECTORIS, UNSPECIFIED VESSEL OR LESION TYPE (H): ICD-10-CM

## 2021-03-30 DIAGNOSIS — Z79.01 CHRONIC ANTICOAGULATION: ICD-10-CM

## 2021-03-30 DIAGNOSIS — Z79.4 TYPE 2 DIABETES MELLITUS WITH HYPERGLYCEMIA, WITH LONG-TERM CURRENT USE OF INSULIN (H): ICD-10-CM

## 2021-03-30 DIAGNOSIS — M84.451S: Primary | ICD-10-CM

## 2021-03-30 PROCEDURE — 99309 SBSQ NF CARE MODERATE MDM 30: CPT | Performed by: NURSE PRACTITIONER

## 2021-03-30 RX ORDER — CARVEDILOL 6.25 MG/1
6.25 TABLET ORAL 2 TIMES DAILY
Start: 2021-03-30

## 2021-03-30 ASSESSMENT — MIFFLIN-ST. JEOR: SCORE: 1354.65

## 2021-03-30 NOTE — LETTER
"    3/30/2021        RE: Nuria Whitley  [address]  [facility] [address]        [place] [facility]  [place] Medical Record Number:  [id number]  Place of Service where encounter took place:  [facility] ([facility]) [[id number]]  Chief Complaint   Patient presents with     RECHECK       HPI:    Nuria Whitley  is a 86 year old (5/24/1934), who is being seen today for an episodic care visit. [facility] stay 3/15/21 through 3/22/21. Admitted to this facility for  rehab, medical management and nursing care. Patient had seen her PCP for right hip/leg pain, but declined xray. Later that day she was walking up steps with her son and they heard a pop and she was unable to bear weight on the right leg. At the ED she was found to have a pathologic fracture of her right femur with a lytic lesion. She underwent ORIF. Post-op course was complicated by anemia, Hgb 7.1, she received 2 units PRBCs. Lantus insulin decreased from 55 units BID to 45 units BID. She remains on her home regimen of Novolog. She is on chronic warfarin for \"chronic\" DVT, but it is not clear how long she has been on this. During brief attempt at chart review, it has been several years.      HPI information obtained from: facility chart records, facility staff and patient report.     Today's concern is:  Pathological fracture of right femur, unspecified pathological cause, sequela  Pain is currently 7/10, she is waiting for pain medication. Therapy is going ok. Hgb 8.7 on 3/29    Essential hypertension  BP 90-100s/50s. Carvedilol dose reduced. She denies dizziness    Chronic anticoagulation  Chronic deep vein thrombosis (DVT) of distal vein of lower extremity, unspecified laterality (H)  INR 2.7 on 3/29    Type 2 diabetes mellitus with hyperglycemia, with long-term current use of insulin (H)  BG range from , most are <200.     Atherosclerosis of coronary artery of native heart with stable angina pectoris, unspecified " vessel or lesion type (H)  Stent 2009, no issues since that time      Past Medical and Surgical History reviewed in Epic today.    MEDICATIONS:    Current Outpatient Medications   Medication Sig Dispense Refill     acetaminophen (TYLENOL) 500 MG tablet Take 2 tablets (1,000 mg) by mouth 3 times daily       albuterol (PROAIR HFA/PROVENTIL HFA/VENTOLIN HFA) 108 (90 Base) MCG/ACT inhaler Inhale 1 puff into the lungs every 4 hours as needed       amLODIPine (NORVASC) 5 MG tablet Take 5 mg by mouth daily       atorvastatin (LIPITOR) 40 MG tablet Take 40 mg by mouth daily       calcium carbonate-vitamin D (OS-KALYAN) 600-400 MG-UNIT chewable tablet Take 1 chew tab by mouth daily       carvedilol (COREG) 6.25 MG tablet Take 1 tablet (6.25 mg) by mouth 2 times daily       chlorhexidine (PERIDEX) 0.12 % solution Swish and spit 15 mLs in mouth 2 times daily       fish oil-omega-3 fatty acids 1000 MG capsule Take 2 g by mouth daily       gabapentin (NEURONTIN) 100 MG capsule Take 100 mg by mouth At Bedtime       insulin aspart (NOVOLOG PEN) 100 UNIT/ML pen Inject Subcutaneous 3 times daily (with meals) If Blood Sugar is 120 to 149, give 2 Units.  If Blood Sugar is 150 to 199, give 3 Units.  If Blood Sugar is 200 to 249, give 6 Units.  If Blood Sugar is 250 to 299, give 9 Units.  If Blood Sugar is 300 to 349, give 12 Units.  If Blood Sugar is greater than 349, give 15 Units.  If Blood Sugar is greater than 349, call MD.  Special Instructions: Recheck POCT glucose in 2 hours and call MD if greater than 350 mg/dl    ALSO   Per Sliding Scale;  If Blood Sugar is 150 to 199, give 2 Units.  If Blood Sugar is 200 to 249, give 3 Units.  If Blood Sugar is 250 to 299, give 5 Units.  If Blood Sugar is 300 to 349, give 6 Units.  If Blood Sugar is greater than 349, give 8 Units.  At Bedtime       insulin aspart (NOVOLOG VIAL) 100 UNITS/ML vial Inject 15-17 Units Subcutaneous 2 times daily Give 15 units before lunch and give 17 units before  "supper daily       insulin glargine (LANTUS VIAL) 100 UNIT/ML vial Inject 45 Units Subcutaneous 2 times daily       melatonin 3 MG tablet Take 3 mg by mouth At Bedtime       nitroGLYcerin (NITROSTAT) 0.4 MG sublingual tablet Place 0.4 mg under the tongue every 5 minutes as needed for chest pain For chest pain place 1 tablet under the tongue every 5 minutes for 3 doses. If symptoms persist 5 minutes after 1st dose call 911.       oxyCODONE (ROXICODONE) 5 MG tablet Take 1-2 tablets (5-10 mg) by mouth every 4 hours as needed for moderate to severe pain Instructions: 5 mg for pain 5-7/10, 10 mg for pain 8-10/10 100 tablet 0     pantoprazole (PROTONIX) 40 MG EC tablet Take 1 tablet by mouth daily       polyethylene glycol (MIRALAX) 17 GM/Dose powder Take 17 g by mouth daily       senna-docusate (SENOKOT-S/PERICOLACE) 8.6-50 MG tablet Take 2 tablets by mouth 2 times daily as needed       warfarin ANTICOAGULANT (COUMADIN) 5 MG tablet Take 5mg on Sun,Mon,Wed,Fri and take 7.5mg on Tues,Thurs,Sat         REVIEW OF SYSTEMS:  10 point ROS of systems including Constitutional, Eyes, Respiratory, Cardiovascular, Gastroenterology, Genitourinary, Integumentary, Musculoskeletal, Psychiatric were all negative except for pertinent positives noted in my HPI.    Objective:  /55   Pulse 69   Temp 98.7  F (37.1  C)   Resp 18   Ht 1.702 m (5' 7\")   Wt 88.2 kg (194 lb 7.2 oz)   SpO2 98%   BMI 30.46 kg/m    Exam:  GENERAL APPEARANCE:  Alert, in no distress, oriented  EYES:  EOM normal, conjunctiva and lids normal  RESP:  respiratory effort and palpation of chest normal, lungs clear to auscultation , no respiratory distress  CV:  Palpation and auscultation of heart done , regular rate and rhythm, no murmur, rub, or gallop, no edema  ABDOMEN:  normal bowel sounds, soft, nontender, no hepatosplenomegaly or other masses  M/S:   Digits and nails normal  SKIN:  Inspection of skin and subcutaneous tissue baseline, Palpation of skin and " subcutaneous tissue baseline  PSYCH:  oriented X 3, memory impaired , affect abnormal flat    Labs:   3/29/21  INR 2.7  HGB 8.7    ASSESSMENT/PLAN:  (M84.451S) Pathological fracture of right femur, unspecified pathological cause, sequela  (primary encounter diagnosis)  Comment: s/p ORIF. Pain controlled. No s/sx DVT. No s/sx poor wound healing. Anemia is expected, no s/sx active bleeding. Goal is to discharge home when PT/OT goals met.  Plan: PT/OT eval and treat, discharge planning per their recommendation. Continue warfarin, monitor s/sx DVT. Continue oxycodone prn for pain, monitor pain severity. Monitor incision. F/u ortho as scheduled.    (I10) Essential hypertension  Comment: She reports no symptoms with low BP, but this likely increases her fall risk and may add to fatigue. Would prefer to see SBPs 110-120s. Carvedilol was reduced slightly from 12.5mg to 6.25mg, will see how she tolerates this.  Plan: Monitor BP. Adjust medication as clinically indicated.    (Z79.01) Chronic anticoagulation  (I82.5Z9) Chronic deep vein thrombosis (DVT) of distal vein of lower extremity, unspecified laterality (H)  Comment: Chronic warfarin, INR therapeutic for goal 2-3  Plan: Continue current POC with no changes at this time and adjustments as needed. INR 2 weeks.    (E11.65,  Z79.4) Type 2 diabetes mellitus with hyperglycemia, with long-term current use of insulin (H)  Comment: BG are too variable to adjust insulin, need to continue with sliding scale insulin.   Plan: Continue current POC with no changes at this time and adjustments as needed.    (I25.118) Atherosclerosis of coronary artery of native heart with stable angina pectoris, unspecified vessel or lesion type (H)  Comment: Asymptomatic  Plan: Continue current POC with no changes at this time and adjustments as needed.      Electronically signed by:  BRENDA Vogel [facility]  Phone: [phone]

## 2021-03-30 NOTE — PROGRESS NOTES
"[place] [facility]  [place] Medical Record Number:  [id number]  Place of Service where encounter took place:  [facility] ([facility]) [[id number]]  Chief Complaint   Patient presents with     RECHECK       HPI:    Nuria Whitley  is a 86 year old (5/24/1934), who is being seen today for an episodic care visit. [facility] stay 3/15/21 through 3/22/21. Admitted to this facility for  rehab, medical management and nursing care. Patient had seen her PCP for right hip/leg pain, but declined xray. Later that day she was walking up steps with her son and they heard a pop and she was unable to bear weight on the right leg. At the ED she was found to have a pathologic fracture of her right femur with a lytic lesion. She underwent ORIF. Post-op course was complicated by anemia, Hgb 7.1, she received 2 units PRBCs. Lantus insulin decreased from 55 units BID to 45 units BID. She remains on her home regimen of Novolog. She is on chronic warfarin for \"chronic\" DVT, but it is not clear how long she has been on this. During brief attempt at chart review, it has been several years.      HPI information obtained from: facility chart records, facility staff and patient report.     Today's concern is:  Pathological fracture of right femur, unspecified pathological cause, sequela  Pain is currently 7/10, she is waiting for pain medication. Therapy is going ok. Hgb 8.7 on 3/29    Essential hypertension  BP 90-100s/50s. Carvedilol dose reduced. She denies dizziness    Chronic anticoagulation  Chronic deep vein thrombosis (DVT) of distal vein of lower extremity, unspecified laterality (H)  INR 2.7 on 3/29    Type 2 diabetes mellitus with hyperglycemia, with long-term current use of insulin (H)  BG range from , most are <200.     Atherosclerosis of coronary artery of native heart with stable angina pectoris, unspecified vessel or lesion type (H)  Stent 2009, no issues since that time      Past Medical and " Dr Herrera reviewed patients medical history. Based upon information available, Dr Herrera indicates that:     Ok to proceed pending clearance.     Thank you.   Elizabet Herrera M.D.  [facility]   Surgical History reviewed in Epic today.    MEDICATIONS:    Current Outpatient Medications   Medication Sig Dispense Refill     acetaminophen (TYLENOL) 500 MG tablet Take 2 tablets (1,000 mg) by mouth 3 times daily       albuterol (PROAIR HFA/PROVENTIL HFA/VENTOLIN HFA) 108 (90 Base) MCG/ACT inhaler Inhale 1 puff into the lungs every 4 hours as needed       amLODIPine (NORVASC) 5 MG tablet Take 5 mg by mouth daily       atorvastatin (LIPITOR) 40 MG tablet Take 40 mg by mouth daily       calcium carbonate-vitamin D (OS-KALYAN) 600-400 MG-UNIT chewable tablet Take 1 chew tab by mouth daily       carvedilol (COREG) 6.25 MG tablet Take 1 tablet (6.25 mg) by mouth 2 times daily       chlorhexidine (PERIDEX) 0.12 % solution Swish and spit 15 mLs in mouth 2 times daily       fish oil-omega-3 fatty acids 1000 MG capsule Take 2 g by mouth daily       gabapentin (NEURONTIN) 100 MG capsule Take 100 mg by mouth At Bedtime       insulin aspart (NOVOLOG PEN) 100 UNIT/ML pen Inject Subcutaneous 3 times daily (with meals) If Blood Sugar is 120 to 149, give 2 Units.  If Blood Sugar is 150 to 199, give 3 Units.  If Blood Sugar is 200 to 249, give 6 Units.  If Blood Sugar is 250 to 299, give 9 Units.  If Blood Sugar is 300 to 349, give 12 Units.  If Blood Sugar is greater than 349, give 15 Units.  If Blood Sugar is greater than 349, call MD.  Special Instructions: Recheck POCT glucose in 2 hours and call MD if greater than 350 mg/dl    ALSO   Per Sliding Scale;  If Blood Sugar is 150 to 199, give 2 Units.  If Blood Sugar is 200 to 249, give 3 Units.  If Blood Sugar is 250 to 299, give 5 Units.  If Blood Sugar is 300 to 349, give 6 Units.  If Blood Sugar is greater than 349, give 8 Units.  At Bedtime       insulin aspart (NOVOLOG VIAL) 100 UNITS/ML vial Inject 15-17 Units Subcutaneous 2 times daily Give 15 units before lunch and give 17 units before supper daily       insulin glargine (LANTUS VIAL) 100 UNIT/ML vial Inject 45 Units  "Subcutaneous 2 times daily       melatonin 3 MG tablet Take 3 mg by mouth At Bedtime       nitroGLYcerin (NITROSTAT) 0.4 MG sublingual tablet Place 0.4 mg under the tongue every 5 minutes as needed for chest pain For chest pain place 1 tablet under the tongue every 5 minutes for 3 doses. If symptoms persist 5 minutes after 1st dose call 911.       oxyCODONE (ROXICODONE) 5 MG tablet Take 1-2 tablets (5-10 mg) by mouth every 4 hours as needed for moderate to severe pain Instructions: 5 mg for pain 5-7/10, 10 mg for pain 8-10/10 100 tablet 0     pantoprazole (PROTONIX) 40 MG EC tablet Take 1 tablet by mouth daily       polyethylene glycol (MIRALAX) 17 GM/Dose powder Take 17 g by mouth daily       senna-docusate (SENOKOT-S/PERICOLACE) 8.6-50 MG tablet Take 2 tablets by mouth 2 times daily as needed       warfarin ANTICOAGULANT (COUMADIN) 5 MG tablet Take 5mg on Sun,Mon,Wed,Fri and take 7.5mg on Tues,Thurs,Sat         REVIEW OF SYSTEMS:  10 point ROS of systems including Constitutional, Eyes, Respiratory, Cardiovascular, Gastroenterology, Genitourinary, Integumentary, Musculoskeletal, Psychiatric were all negative except for pertinent positives noted in my HPI.    Objective:  /55   Pulse 69   Temp 98.7  F (37.1  C)   Resp 18   Ht 1.702 m (5' 7\")   Wt 88.2 kg (194 lb 7.2 oz)   SpO2 98%   BMI 30.46 kg/m    Exam:  GENERAL APPEARANCE:  Alert, in no distress, oriented  EYES:  EOM normal, conjunctiva and lids normal  RESP:  respiratory effort and palpation of chest normal, lungs clear to auscultation , no respiratory distress  CV:  Palpation and auscultation of heart done , regular rate and rhythm, no murmur, rub, or gallop, no edema  ABDOMEN:  normal bowel sounds, soft, nontender, no hepatosplenomegaly or other masses  M/S:   Digits and nails normal  SKIN:  Inspection of skin and subcutaneous tissue baseline, Palpation of skin and subcutaneous tissue baseline  PSYCH:  oriented X 3, memory impaired , affect " abnormal flat    Labs:   3/29/21  INR 2.7  HGB 8.7    ASSESSMENT/PLAN:  (M84.451S) Pathological fracture of right femur, unspecified pathological cause, sequela  (primary encounter diagnosis)  Comment: s/p ORIF. Pain controlled. No s/sx DVT. No s/sx poor wound healing. Anemia is expected, no s/sx active bleeding. Goal is to discharge home when PT/OT goals met.  Plan: PT/OT eval and treat, discharge planning per their recommendation. Continue warfarin, monitor s/sx DVT. Continue oxycodone prn for pain, monitor pain severity. Monitor incision. F/u ortho as scheduled.    (I10) Essential hypertension  Comment: She reports no symptoms with low BP, but this likely increases her fall risk and may add to fatigue. Would prefer to see SBPs 110-120s. Carvedilol was reduced slightly from 12.5mg to 6.25mg, will see how she tolerates this.  Plan: Monitor BP. Adjust medication as clinically indicated.    (Z79.01) Chronic anticoagulation  (I82.5Z9) Chronic deep vein thrombosis (DVT) of distal vein of lower extremity, unspecified laterality (H)  Comment: Chronic warfarin, INR therapeutic for goal 2-3  Plan: Continue current POC with no changes at this time and adjustments as needed. INR 2 weeks.    (E11.65,  Z79.4) Type 2 diabetes mellitus with hyperglycemia, with long-term current use of insulin (H)  Comment: BG are too variable to adjust insulin, need to continue with sliding scale insulin.   Plan: Continue current POC with no changes at this time and adjustments as needed.    (I25.118) Atherosclerosis of coronary artery of native heart with stable angina pectoris, unspecified vessel or lesion type (H)  Comment: Asymptomatic  Plan: Continue current POC with no changes at this time and adjustments as needed.      Electronically signed by:  BRENDA Vogel [facility]  Phone: [phone]

## 2021-03-30 NOTE — NURSING NOTE
[facility]  [place] Medical Record Number:  [id number]  Place of Service where encounter took place:  [facility] [place] ([facility]) [[id number]]  Chief Complaint   Patient presents with     RECHECK       HPI:    Nuria Whitley  is a 86 year old (5/24/1934), who is being seen today for an episodic care visit.  HPI information obtained from: facility chart records, patient report and [facility] chart review.     Today's concern is:  Pathological fracture of right femur, unspecified pathological cause, sequela  Patient is currently rating pain 7/10, states has asked for pain medication but hasn't received it yet.  Reports that therapy is going ok but expresses frustration with the lack of assistance in the bathroom by staff at times.      Essential hypertension  BP 90's-100s/50's.  Patient denies dizziness with this.    Chronic anticoagulation  Chronic deep vein thrombosis (DVT) of distal vein of lower extremity, unspecified laterality (H)  INR 2.7, Hgb 8.7 on 3/29.  On warfarin    Type 2 diabetes mellitus with hyperglycemia, with long-term current use of insulin (H)  Blood glucoses  over last week, average <200 but widely variable. Patient reports has felt low blood sugars with hand cramping, tingling this week.    Atherosclerosis of coronary artery of native heart with stable angina pectoris, unspecified vessel or lesion type (H)  Stenting of LAD 5/2009      Left-sided anterior chest wall pain  Patient reports felt a pull with sharp pain while gerald lift moving patient into the bathroom.  Patient reports sharp pain with certain movements and with coughing.  Denies SOB.      Past Medical and Surgical History reviewed in Epic today.    MEDICATIONS:      Current Outpatient Medications   Medication Sig Dispense Refill     acetaminophen (TYLENOL) 500 MG tablet Take 2 tablets (1,000 mg) by mouth 3 times daily       albuterol (PROAIR HFA/PROVENTIL HFA/VENTOLIN HFA) 108 (90 Base) MCG/ACT inhaler Inhale 1  puff into the lungs every 4 hours as needed       amLODIPine (NORVASC) 5 MG tablet Take 5 mg by mouth daily       atorvastatin (LIPITOR) 40 MG tablet Take 40 mg by mouth daily       calcium carbonate-vitamin D (OS-KALYAN) 600-400 MG-UNIT chewable tablet Take 1 chew tab by mouth daily       carvedilol (COREG) 12.5 MG tablet Take 1 tablet (12.5 mg) by mouth 2 times daily       chlorhexidine (PERIDEX) 0.12 % solution Swish and spit 15 mLs in mouth 2 times daily       fish oil-omega-3 fatty acids 1000 MG capsule Take 2 g by mouth daily       gabapentin (NEURONTIN) 100 MG capsule Take 100 mg by mouth At Bedtime       insulin aspart (NOVOLOG PEN) 100 UNIT/ML pen Inject Subcutaneous 3 times daily (with meals) If Blood Sugar is 120 to 149, give 2 Units.  If Blood Sugar is 150 to 199, give 3 Units.  If Blood Sugar is 200 to 249, give 6 Units.  If Blood Sugar is 250 to 299, give 9 Units.  If Blood Sugar is 300 to 349, give 12 Units.  If Blood Sugar is greater than 349, give 15 Units.  If Blood Sugar is greater than 349, call MD.  Special Instructions: Recheck POCT glucose in 2 hours and call MD if greater than 350 mg/dl    ALSO   Per Sliding Scale;  If Blood Sugar is 150 to 199, give 2 Units.  If Blood Sugar is 200 to 249, give 3 Units.  If Blood Sugar is 250 to 299, give 5 Units.  If Blood Sugar is 300 to 349, give 6 Units.  If Blood Sugar is greater than 349, give 8 Units.  At Bedtime       insulin aspart (NOVOLOG VIAL) 100 UNITS/ML vial Inject 15-17 Units Subcutaneous 2 times daily Give 15 units before lunch and give 17 units before supper daily       insulin glargine (LANTUS VIAL) 100 UNIT/ML vial Inject 45 Units Subcutaneous 2 times daily       melatonin 3 MG tablet Take 3 mg by mouth At Bedtime       nitroGLYcerin (NITROSTAT) 0.4 MG sublingual tablet Place 0.4 mg under the tongue every 5 minutes as needed for chest pain For chest pain place 1 tablet under the tongue every 5 minutes for 3 doses. If symptoms persist 5  "minutes after 1st dose call 911.       oxyCODONE (ROXICODONE) 5 MG tablet Take 1-2 tablets (5-10 mg) by mouth every 4 hours as needed for moderate to severe pain Instructions: 5 mg for pain 5-7/10, 10 mg for pain 8-10/10 100 tablet 0     pantoprazole (PROTONIX) 40 MG EC tablet Take 1 tablet by mouth daily       polyethylene glycol (MIRALAX) 17 GM/Dose powder Take 17 g by mouth daily       senna-docusate (SENOKOT-S/PERICOLACE) 8.6-50 MG tablet Take 2 tablets by mouth 2 times daily as needed       warfarin ANTICOAGULANT (COUMADIN) 5 MG tablet Take 5mg on Sun,Mon,Wed,Fri and take 7.5mg on Tues,Thurs,Sat           REVIEW OF SYSTEMS:  10 point ROS of systems including Constitutional, Eyes, Respiratory, Cardiovascular, Gastroenterology, Genitourinary, Integumentary, Musculoskeletal, Psychiatric were all negative except for pertinent positives noted in my HPI.    Objective:  /55   Pulse 69   Temp 98.7  F (37.1  C)   Resp 18   Ht 1.702 m (5' 7\")   Wt 88.2 kg (194 lb 7.2 oz)   SpO2 98%   BMI 30.46 kg/m    Exam:  GENERAL APPEARANCE:  Alert, in no distress, oriented, cooperative  EYES:  EOM normal, conjunctiva and lids normal  RESP:  lungs clear to auscultation , no respiratory distress, Palpation along left lower rib border induces pain  CV:  Palpation and auscultation of heart done , regular rate and rhythm, no murmur, rub, or gallop, peripheral edema 1+ in bilateral ankles  ABDOMEN:  normal bowel sounds, soft, nontender, no hepatosplenomegaly or other masses  M/S:   unable to assess movement, gait  SKIN:  Inspection of skin and subcutaneous tissue baseline, Palpation of skin and subcutaneous tissue baseline, wound appearance: not assessed as patient dressed and in chair  PSYCH:  oriented X 3, affect flat    Labs:   3/29 Hgb 8.7, INR 2.7    ASSESSMENT/PLAN:  (A60.719S) Pathological fracture of right femur, unspecified pathological cause, sequela  (primary encounter diagnosis)  Comment: Patient is WBAT but " patient reports difficulty due to pain.  Recently had acetaminophen switched to scheduled TID 1000 mg, prn oxycodone available.  Only 1 dose given yesterday, 1 early am today.  Patient reports has asked for additional dose today but hasn't received.  Ortho appointment on 4/1/2021 scheduled.  Plan: PT/OT per their recommendations.  Ortho appointment as scheduled.  Encouraged patient to request prn oxycodone prior to therapy, bathroom trips.    (I10) Essential hypertension  Comment: Carvedilol order decreased due to soft pressures last week from 12.5 mg BID to 6.25 mg BID.  Also on 5 mg amlodipine daily.  Patient denies lightheadedness with this but this puts the patient at increased risk of falls.   Plan: Would recommend decreasing amlodipine dose to 2.5 mg daily.    (Z79.01) Chronic anticoagulation  (I82.5Z9) Chronic deep vein thrombosis (DVT) of distal vein of lower extremity, unspecified laterality (H)  Comment: INR 2.7.  This is within therapeutic range.  Hgb 8.7, was 9.1 at hospital discharge.  Patient with history of macrocytic anemia.  No obvious signs of acute blood loss at this time.  Patient denies any calf pain, swelling, or shortness of breath.  Plan: Continue with same warfarin dosing.  Recheck INR 4/12.      (E11.65,  Z79.4) Type 2 diabetes mellitus with hyperglycemia, with long-term current use of insulin (H)  Comment: Blood sugars still extremely variable.  Patient noted to have bag of snack size candy bars on bedside table.  Patient's Lantus not at home dose (was at 55 units BID, now at 45 units BID) however patient has had a couple of readings into 80's on this and feeling symptomatic.  Plan: No changes recommended at this time.  Continue current POC with no changes at this time and adjustments as needed.      (I25.118) Atherosclerosis of coronary artery of native heart with stable angina pectoris, unspecified vessel or lesion type (H)  Comment: No current symptoms   Plan: Continue current POC with  no changes at this time and adjustments as needed.      Electronically signed by:  Guerda Fink RN, BSN, AGNP

## 2021-04-05 ENCOUNTER — TELEPHONE (OUTPATIENT)
Dept: GERIATRICS | Facility: CLINIC | Age: 86
End: 2021-04-05

## 2021-04-05 NOTE — TELEPHONE ENCOUNTER
[facility] Nurse Triage Telephone Note    Provider: Savi Sierra NP  Facility: [facility]  Facility Type:  TCU    Caller: Tano  Call Back Number: [phone]    Allergies:    Allergies   Allergen Reactions     Hydrocodone-Acetaminophen Nausea and Vomiting        Reason for call: Over weekend & this am having low am BG 66 & 72. Is on Lantus 45uBID. Asparte sliding scale TID & Scheduled Asparte 15u lunch, 17U supper. Is having HS snack.    Orders:  Decrease HS Lantus to 40u. I will see pt tomorrow.    Provider giving Order:  Bridget Gagnon NP    Verbal Order given to: Tano Lopez RN

## 2021-04-06 ENCOUNTER — NURSING HOME VISIT (OUTPATIENT)
Dept: GERIATRICS | Facility: CLINIC | Age: 86
End: 2021-04-06
Payer: COMMERCIAL

## 2021-04-06 VITALS
SYSTOLIC BLOOD PRESSURE: 108 MMHG | OXYGEN SATURATION: 96 % | WEIGHT: 203.6 LBS | TEMPERATURE: 99.1 F | RESPIRATION RATE: 18 BRPM | DIASTOLIC BLOOD PRESSURE: 56 MMHG | BODY MASS INDEX: 31.96 KG/M2 | HEART RATE: 63 BPM | HEIGHT: 67 IN

## 2021-04-06 DIAGNOSIS — E11.65 TYPE 2 DIABETES MELLITUS WITH HYPERGLYCEMIA, WITH LONG-TERM CURRENT USE OF INSULIN (H): ICD-10-CM

## 2021-04-06 DIAGNOSIS — I82.5Z9 CHRONIC DEEP VEIN THROMBOSIS (DVT) OF DISTAL VEIN OF LOWER EXTREMITY, UNSPECIFIED LATERALITY (H): ICD-10-CM

## 2021-04-06 DIAGNOSIS — M84.451S: Primary | ICD-10-CM

## 2021-04-06 DIAGNOSIS — Z79.01 CHRONIC ANTICOAGULATION: ICD-10-CM

## 2021-04-06 DIAGNOSIS — D62 ACUTE BLOOD LOSS ANEMIA: ICD-10-CM

## 2021-04-06 DIAGNOSIS — I10 ESSENTIAL HYPERTENSION: ICD-10-CM

## 2021-04-06 DIAGNOSIS — Z87.81 S/P ORIF (OPEN REDUCTION INTERNAL FIXATION) FRACTURE: ICD-10-CM

## 2021-04-06 DIAGNOSIS — Z98.890 S/P ORIF (OPEN REDUCTION INTERNAL FIXATION) FRACTURE: ICD-10-CM

## 2021-04-06 DIAGNOSIS — Z79.4 TYPE 2 DIABETES MELLITUS WITH HYPERGLYCEMIA, WITH LONG-TERM CURRENT USE OF INSULIN (H): ICD-10-CM

## 2021-04-06 PROCEDURE — 99309 SBSQ NF CARE MODERATE MDM 30: CPT | Performed by: NURSE PRACTITIONER

## 2021-04-06 ASSESSMENT — MIFFLIN-ST. JEOR: SCORE: 1396.15

## 2021-04-06 NOTE — PROGRESS NOTES
[facility]  [place] Medical Record Number:  [id number]  [facility] [place] [facility] [[id number]]  Chief Complaint   Patient presents with     RECHECK       HPI:    Nuria Whitley  is a 86 year old (5/24/1934), who is being seen today for an episodic care visit.  HPI information obtained from: facility chart records, facility staff, patient report and [facility] chart review. Today's concern is:  Patient has been in TCU since 3/22 following hospitalization due to right leg pain. She was treated for pathologic fracture of right femur. She underwent ORIF on 3/17 . Post operative course complicated by ABLA and low BGs.  Since in TCU she has had some lown blood glucose readings and lantus reduced. Today she denies shortness of breath. She is working with therapy.     Past Medical and Surgical History reviewed in Epic today.    MEDICATIONS:    Current Outpatient Medications   Medication Sig Dispense Refill     acetaminophen (TYLENOL) 500 MG tablet Take 2 tablets (1,000 mg) by mouth 3 times daily       albuterol (PROAIR HFA/PROVENTIL HFA/VENTOLIN HFA) 108 (90 Base) MCG/ACT inhaler Inhale 1 puff into the lungs every 4 hours as needed       amLODIPine (NORVASC) 5 MG tablet Take 5 mg by mouth daily       atorvastatin (LIPITOR) 40 MG tablet Take 40 mg by mouth daily       calcium carbonate-vitamin D (OS-KALYAN) 600-400 MG-UNIT chewable tablet Take 1 chew tab by mouth daily       carvedilol (COREG) 6.25 MG tablet Take 1 tablet (6.25 mg) by mouth 2 times daily       chlorhexidine (PERIDEX) 0.12 % solution Swish and spit 15 mLs in mouth 2 times daily       fish oil-omega-3 fatty acids 1000 MG capsule Take 2 g by mouth daily       gabapentin (NEURONTIN) 100 MG capsule Take 100 mg by mouth At Bedtime       insulin aspart (NOVOLOG PEN) 100 UNIT/ML pen Inject Subcutaneous 3 times daily (with meals) If Blood Sugar is 120 to 149, give 2 Units.  If Blood Sugar is  150 to 199, give 3 Units.  If Blood Sugar is 200 to 249, give 6 Units.  If Blood Sugar is 250 to 299, give 9 Units.  If Blood Sugar is 300 to 349, give 12 Units.  If Blood Sugar is greater than 349, give 15 Units.  If Blood Sugar is greater than 349, call MD.  Special Instructions: Recheck POCT glucose in 2 hours and call MD if greater than 350 mg/dl    ALSO   Per Sliding Scale;  If Blood Sugar is 150 to 199, give 2 Units.  If Blood Sugar is 200 to 249, give 3 Units.  If Blood Sugar is 250 to 299, give 5 Units.  If Blood Sugar is 300 to 349, give 6 Units.  If Blood Sugar is greater than 349, give 8 Units.  At Bedtime       insulin aspart (NOVOLOG VIAL) 100 UNITS/ML vial Inject 15-17 Units Subcutaneous 2 times daily Give 15 units before lunch and give 17 units before supper daily       insulin glargine (LANTUS VIAL) 100 UNIT/ML vial Inject 45 Units Subcutaneous 2 times daily       melatonin 3 MG tablet Take 3 mg by mouth At Bedtime       nitroGLYcerin (NITROSTAT) 0.4 MG sublingual tablet Place 0.4 mg under the tongue every 5 minutes as needed for chest pain For chest pain place 1 tablet under the tongue every 5 minutes for 3 doses. If symptoms persist 5 minutes after 1st dose call 911.       oxyCODONE (ROXICODONE) 5 MG tablet Take 1-2 tablets (5-10 mg) by mouth every 4 hours as needed for moderate to severe pain Instructions: 5 mg for pain 5-7/10, 10 mg for pain 8-10/10 100 tablet 0     pantoprazole (PROTONIX) 40 MG EC tablet Take 1 tablet by mouth daily       polyethylene glycol (MIRALAX) 17 GM/Dose powder Take 17 g by mouth daily       senna-docusate (SENOKOT-S/PERICOLACE) 8.6-50 MG tablet Take 2 tablets by mouth 2 times daily as needed       warfarin ANTICOAGULANT (COUMADIN) 5 MG tablet Take 5mg on Sun,Mon,Wed,Fri and take 7.5mg on Tues,Thurs,Sat           REVIEW OF SYSTEMS:  4 point ROS including Respiratory, CV, GI and , other than that noted in the HPI,  is negative    Objective:  /56   Pulse 63    "Temp 99.1  F (37.3  C)   Resp 18   Ht 1.702 m (5' 7\")   Wt 92.4 kg (203 lb 9.6 oz)   SpO2 96%   BMI 31.89 kg/m    Exam:  GENERAL APPEARANCE:  Alert, in no distress  ENT:  Mouth and posterior oropharynx normal, moist mucous membranes, hearing acuity adequate   EYES:  EOM, conjunctivae, lids, pupils and irises normal  RESP:  respiratory effort  normal, no respiratory distress,  CV:   Edema none  ABDOMEN:  normal bowel sounds, soft, nontender,  M/S:   Gait and station not observed, Digits and nails nl  SKIN:  Inspection/Palpation of skin and subcutaneous tissue no rash  NEURO: 2-12 in normal limits and at patient's baseline  PSYCH:  insight and judgement, memory mild cognitive impairment  , affect and mood flat    Labs:   [facility]F [facility]. Please refer to them using [employer]/Care Everywhere. and Recent labs in EPIC reviewed by me today.     ASSESSMENT/PLAN:  Pathological fracture of right femur, unspecified pathological cause, sequela  S/P ORIF (open reduction internal fixation) fracture  Making some progress with therapy. She denies pain at rest.   -continue physical therapy and OCCUPATIONAL THERAPY   -DVT prophylaxis-warfarin  -continue current analgesics  -follow up with ortho as planned    Acute blood loss anemia  Hg dropped to 6.8 post operatively (from 8.6). hgb now stable at mid 8s  -periodic hb    Type 2 diabetes mellitus with hyperglycemia, with long-term current use of insulin (H)  Called about low am BGs in TCU.  During hospitalization lantus reduced  BG in TCU    Am 104, 72, 66. 105  Noon 142, 129  5pm 89, 79, 139  Hs 210, 162, 134  -reduce hs lantus 40u continue am lantus 45u  -continue novolog with meals and sliding scale     Chronic deep vein thrombosis (DVT) of distal vein of lower extremity, unspecified laterality (H)  Chronic anticoagulation  3/29 INR 2.7  -continue warfarin 5mg Sund, M, W, F and 7.5mg T, Th, Sat  -recheck INR 4/12    Essential hypertension  BPs reviewed. BPs are " controlled  -no change to medications      Electronically signed by:  BRENDA Granados CNP

## 2021-04-06 NOTE — LETTER
4/6/2021        RE: Nuria Whitley  [address]  [facility] [address]        [place] [facility]  [place] Medical Record Number:  [id number]  [facility] [[id number]]  Chief Complaint   Patient presents with     RECHECK       HPI:    Nuria Whitley  is a 86 year old (5/24/1934), who is being seen today for an episodic care visit.  HPI information obtained from: facility chart records, facility staff, patient report and [facility] chart review. Today's concern is:  Patient has been in TCU since 3/22 following hospitalization due to right leg pain. She was treated for pathologic fracture of right femur. She underwent ORIF on 3/17 . Post operative course complicated by ABLA and low BGs.  Since in TCU she has had some lown blood glucose readings and lantus reduced. Today she denies shortness of breath. She is working with therapy.     Past Medical and Surgical History reviewed in Epic today.    MEDICATIONS:    Current Outpatient Medications   Medication Sig Dispense Refill     acetaminophen (TYLENOL) 500 MG tablet Take 2 tablets (1,000 mg) by mouth 3 times daily       albuterol (PROAIR HFA/PROVENTIL HFA/VENTOLIN HFA) 108 (90 Base) MCG/ACT inhaler Inhale 1 puff into the lungs every 4 hours as needed       amLODIPine (NORVASC) 5 MG tablet Take 5 mg by mouth daily       atorvastatin (LIPITOR) 40 MG tablet Take 40 mg by mouth daily       calcium carbonate-vitamin D (OS-KALYAN) 600-400 MG-UNIT chewable tablet Take 1 chew tab by mouth daily       carvedilol (COREG) 6.25 MG tablet Take 1 tablet (6.25 mg) by mouth 2 times daily       chlorhexidine (PERIDEX) 0.12 % solution Swish and spit 15 mLs in mouth 2 times daily       fish oil-omega-3 fatty acids 1000 MG capsule Take 2 g by mouth daily       gabapentin (NEURONTIN) 100 MG capsule Take 100 mg by mouth At Bedtime       insulin aspart (NOVOLOG PEN) 100 UNIT/ML pen Inject  Subcutaneous 3 times daily (with meals) If Blood Sugar is 120 to 149, give 2 Units.  If Blood Sugar is 150 to 199, give 3 Units.  If Blood Sugar is 200 to 249, give 6 Units.  If Blood Sugar is 250 to 299, give 9 Units.  If Blood Sugar is 300 to 349, give 12 Units.  If Blood Sugar is greater than 349, give 15 Units.  If Blood Sugar is greater than 349, call MD.  Special Instructions: Recheck POCT glucose in 2 hours and call MD if greater than 350 mg/dl    ALSO   Per Sliding Scale;  If Blood Sugar is 150 to 199, give 2 Units.  If Blood Sugar is 200 to 249, give 3 Units.  If Blood Sugar is 250 to 299, give 5 Units.  If Blood Sugar is 300 to 349, give 6 Units.  If Blood Sugar is greater than 349, give 8 Units.  At Bedtime       insulin aspart (NOVOLOG VIAL) 100 UNITS/ML vial Inject 15-17 Units Subcutaneous 2 times daily Give 15 units before lunch and give 17 units before supper daily       insulin glargine (LANTUS VIAL) 100 UNIT/ML vial Inject 45 Units Subcutaneous 2 times daily       melatonin 3 MG tablet Take 3 mg by mouth At Bedtime       nitroGLYcerin (NITROSTAT) 0.4 MG sublingual tablet Place 0.4 mg under the tongue every 5 minutes as needed for chest pain For chest pain place 1 tablet under the tongue every 5 minutes for 3 doses. If symptoms persist 5 minutes after 1st dose call 911.       oxyCODONE (ROXICODONE) 5 MG tablet Take 1-2 tablets (5-10 mg) by mouth every 4 hours as needed for moderate to severe pain Instructions: 5 mg for pain 5-7/10, 10 mg for pain 8-10/10 100 tablet 0     pantoprazole (PROTONIX) 40 MG EC tablet Take 1 tablet by mouth daily       polyethylene glycol (MIRALAX) 17 GM/Dose powder Take 17 g by mouth daily       senna-docusate (SENOKOT-S/PERICOLACE) 8.6-50 MG tablet Take 2 tablets by mouth 2 times daily as needed       warfarin ANTICOAGULANT (COUMADIN) 5 MG tablet Take 5mg on Sun,Mon,Wed,Fri and take 7.5mg on Tues,Thurs,Sat           REVIEW OF SYSTEMS:  4 point ROS including Respiratory,  "CV, GI and , other than that noted in the HPI,  is negative    Objective:  /56   Pulse 63   Temp 99.1  F (37.3  C)   Resp 18   Ht 1.702 m (5' 7\")   Wt 92.4 kg (203 lb 9.6 oz)   SpO2 96%   BMI 31.89 kg/m    Exam:  GENERAL APPEARANCE:  Alert, in no distress  ENT:  Mouth and posterior oropharynx normal, moist mucous membranes, hearing acuity adequate   EYES:  EOM, conjunctivae, lids, pupils and irises normal  RESP:  respiratory effort  normal, no respiratory distress,  CV:   Edema none  ABDOMEN:  normal bowel sounds, soft, nontender,  M/S:   Gait and station not observed, Digits and nails nl  SKIN:  Inspection/Palpation of skin and subcutaneous tissue no rash  NEURO: 2-12 in normal limits and at patient's baseline  PSYCH:  insight and judgement, memory mild cognitive impairment  , affect and mood flat    Labs:   Labs done in SNF are in [facility]. Please refer to them using [employer]/Care Everywhere. and Recent labs in [facility] reviewed by me today.     ASSESSMENT/PLAN:  Pathological fracture of right femur, unspecified pathological cause, sequela  S/P ORIF (open reduction internal fixation) fracture  Making some progress with therapy. She denies pain at rest.   -continue physical therapy and OCCUPATIONAL THERAPY   -DVT prophylaxis-warfarin  -continue current analgesics  -follow up with ortho as planned    Acute blood loss anemia  Hg dropped to 6.8 post operatively (from 8.6). hgb now stable at mid 8s  -periodic hb    Type 2 diabetes mellitus with hyperglycemia, with long-term current use of insulin (H)  Called about low am BGs in TCU.  During hospitalization lantus reduced  BG in TCU    Am 104, 72, 66. 105  Noon 142, 129  5pm 89, 79, 139  Hs 210, 162, 134  -reduce hs lantus 40u continue am lantus 45u  -continue novolog with meals and sliding scale     Chronic deep vein thrombosis (DVT) of distal vein of lower extremity, unspecified laterality (H)  Chronic anticoagulation  3/29 INR 2.7  -continue warfarin 5mg " Sund, M, W, F and 7.5mg T, Th, Sat  -recheck INR 4/12    Essential hypertension  BPs reviewed. BPs are controlled  -no change to medications      Electronically signed by:  BRENDA Granados CNP                 Sincerely,        BRENDA Granados CNP

## 2021-04-12 ENCOUNTER — TELEPHONE (OUTPATIENT)
Dept: GERIATRICS | Facility: CLINIC | Age: 86
End: 2021-04-12

## 2021-04-12 NOTE — TELEPHONE ENCOUNTER
Notified by  that patient will be discharging today due to insurance no longer covering. Her family missed the appeal window, so they can no longer do this. Therapy feels she will be safe to discharge with home care.     INR today was 2.3. Will continue with current dose of warfarin. Next INR 4/26    Plan:  Discharge to home with RN/PT/OT/HHA/MAXINE  OK to send remaining oxycodone

## 2021-04-12 NOTE — TELEPHONE ENCOUNTER
Documentation of Face-to-Face and Certification for Home Health Services     Patient: Nuria Whitley   YOB: 1934  MR Number: [id number]  Today's Date: 4/12/2021    I certify that patient: Nuria Whitley is under my care and that I, or a nurse practitioner or physician's assistant working with me, had a face-to-face encounter that meets the physician face-to-face encounter requirements with this patient on: 4/6/2021.    This encounter with the patient was in whole, or in part, for the following medical condition, which is the primary reason for home health care: Right femur fracture, chronic DVT.    I certify that, based on my findings, the following services are medically necessary home health services: Nursing, Occupational Therapy, Physical Therapy and Social Work.    My clinical findings support the need for the above services because: Nurse is needed: for any and all services, including medication management, and to manage warfarin, next INR draw on 4/26.., Occupational Therapy Services are needed to assess and treat cognitive ability and address ADL safety due to impairment in activity tolerance and insight and judgement. and Physical Therapy Services are needed to assess and treat the following functional impairments: weakness related to femur fracture.    Further, I certify that my clinical findings support that this patient is homebound (i.e. absences from home require considerable and taxing effort and are for medical reasons or [facility] services or infrequently or of short duration when for other reasons) because: Requires assistance of another person or specialized equipment to access medical services because patient: Requires supervision of another for safe transfer...    Based on the above findings. I certify that this patient is confined to the home and needs intermittent skilled nursing care, physical therapy and/or speech therapy.  The patient is under my care, and I have  initiated the establishment of the plan of care.  This patient will be followed by a physician who will periodically review the plan of care.  Physician/Provider to provide follow up care: Garret Abel    Responsible Medicare certified PECOS Physician: Dr.Dan Cyrus MD, signing F2F and only signing for initial order. Please send all follow up questions and concerns or needed follow up signatures to the PCP, who [place] has on file as:  Garret Abel.  Date: 4/12/2021